# Patient Record
Sex: FEMALE | Race: BLACK OR AFRICAN AMERICAN | Employment: UNEMPLOYED | URBAN - METROPOLITAN AREA
[De-identification: names, ages, dates, MRNs, and addresses within clinical notes are randomized per-mention and may not be internally consistent; named-entity substitution may affect disease eponyms.]

---

## 2023-12-14 LAB
PAP SMEAR: NORMAL
PAP SMEAR: NORMAL

## 2024-02-18 ASSESSMENT — PATIENT HEALTH QUESTIONNAIRE - PHQ9
SUM OF ALL RESPONSES TO PHQ QUESTIONS 1-9: 0
6. FEELING BAD ABOUT YOURSELF - OR THAT YOU ARE A FAILURE OR HAVE LET YOURSELF OR YOUR FAMILY DOWN: NOT AT ALL
5. POOR APPETITE OR OVEREATING: NOT AT ALL
1. LITTLE INTEREST OR PLEASURE IN DOING THINGS: 0
9. THOUGHTS THAT YOU WOULD BE BETTER OFF DEAD, OR OF HURTING YOURSELF: 0
4. FEELING TIRED OR HAVING LITTLE ENERGY: NOT AT ALL
SUM OF ALL RESPONSES TO PHQ QUESTIONS 1-9: 0
9. THOUGHTS THAT YOU WOULD BE BETTER OFF DEAD, OR OF HURTING YOURSELF: NOT AT ALL
4. FEELING TIRED OR HAVING LITTLE ENERGY: 0
10. IF YOU CHECKED OFF ANY PROBLEMS, HOW DIFFICULT HAVE THESE PROBLEMS MADE IT FOR YOU TO DO YOUR WORK, TAKE CARE OF THINGS AT HOME, OR GET ALONG WITH OTHER PEOPLE: NOT DIFFICULT AT ALL
7. TROUBLE CONCENTRATING ON THINGS, SUCH AS READING THE NEWSPAPER OR WATCHING TELEVISION: 0
SUM OF ALL RESPONSES TO PHQ9 QUESTIONS 1 & 2: 0
5. POOR APPETITE OR OVEREATING: 0
3. TROUBLE FALLING OR STAYING ASLEEP: 0
SUM OF ALL RESPONSES TO PHQ QUESTIONS 1-9: 0
6. FEELING BAD ABOUT YOURSELF - OR THAT YOU ARE A FAILURE OR HAVE LET YOURSELF OR YOUR FAMILY DOWN: 0
2. FEELING DOWN, DEPRESSED OR HOPELESS: 0
7. TROUBLE CONCENTRATING ON THINGS, SUCH AS READING THE NEWSPAPER OR WATCHING TELEVISION: NOT AT ALL
8. MOVING OR SPEAKING SO SLOWLY THAT OTHER PEOPLE COULD HAVE NOTICED. OR THE OPPOSITE, BEING SO FIGETY OR RESTLESS THAT YOU HAVE BEEN MOVING AROUND A LOT MORE THAN USUAL: 0
SUM OF ALL RESPONSES TO PHQ QUESTIONS 1-9: 0
2. FEELING DOWN, DEPRESSED OR HOPELESS: NOT AT ALL
SUM OF ALL RESPONSES TO PHQ QUESTIONS 1-9: 0
1. LITTLE INTEREST OR PLEASURE IN DOING THINGS: NOT AT ALL
3. TROUBLE FALLING OR STAYING ASLEEP: NOT AT ALL
10. IF YOU CHECKED OFF ANY PROBLEMS, HOW DIFFICULT HAVE THESE PROBLEMS MADE IT FOR YOU TO DO YOUR WORK, TAKE CARE OF THINGS AT HOME, OR GET ALONG WITH OTHER PEOPLE: 0
8. MOVING OR SPEAKING SO SLOWLY THAT OTHER PEOPLE COULD HAVE NOTICED. OR THE OPPOSITE - BEING SO FIDGETY OR RESTLESS THAT YOU HAVE BEEN MOVING AROUND A LOT MORE THAN USUAL: NOT AT ALL

## 2024-02-21 ENCOUNTER — OFFICE VISIT (OUTPATIENT)
Dept: INTERNAL MEDICINE CLINIC | Age: 39
End: 2024-02-21
Payer: COMMERCIAL

## 2024-02-21 VITALS
SYSTOLIC BLOOD PRESSURE: 110 MMHG | DIASTOLIC BLOOD PRESSURE: 70 MMHG | HEIGHT: 62 IN | TEMPERATURE: 97.4 F | HEART RATE: 83 BPM | BODY MASS INDEX: 19.51 KG/M2 | WEIGHT: 106 LBS | OXYGEN SATURATION: 98 %

## 2024-02-21 DIAGNOSIS — Z80.3 FAMILY HISTORY OF BREAST CANCER: ICD-10-CM

## 2024-02-21 DIAGNOSIS — D70.9 NEUTROPENIA, UNSPECIFIED TYPE (HCC): ICD-10-CM

## 2024-02-21 DIAGNOSIS — E55.9 VITAMIN D DEFICIENCY: ICD-10-CM

## 2024-02-21 DIAGNOSIS — Z00.00 ANNUAL PHYSICAL EXAM: Primary | ICD-10-CM

## 2024-02-21 DIAGNOSIS — Z23 NEED FOR DIPHTHERIA-TETANUS-PERTUSSIS (TDAP) VACCINE: ICD-10-CM

## 2024-02-21 LAB
BILIRUBIN, POC: NORMAL
BLOOD URINE, POC: NORMAL
CLARITY, POC: CLEAR
COLOR, POC: NORMAL
GLUCOSE URINE, POC: NORMAL
KETONES, POC: NORMAL
LEUKOCYTE EST, POC: NORMAL
NITRITE, POC: NORMAL
PH, POC: 7
PROTEIN, POC: NORMAL
SPECIFIC GRAVITY, POC: 1.01
UROBILINOGEN, POC: NORMAL

## 2024-02-21 PROCEDURE — 90471 IMMUNIZATION ADMIN: CPT | Performed by: INTERNAL MEDICINE

## 2024-02-21 PROCEDURE — 81002 URINALYSIS NONAUTO W/O SCOPE: CPT | Performed by: INTERNAL MEDICINE

## 2024-02-21 PROCEDURE — 90715 TDAP VACCINE 7 YRS/> IM: CPT | Performed by: INTERNAL MEDICINE

## 2024-02-21 PROCEDURE — 99396 PREV VISIT EST AGE 40-64: CPT | Performed by: INTERNAL MEDICINE

## 2024-02-21 RX ORDER — ACETAMINOPHEN 160 MG
2000 TABLET,DISINTEGRATING ORAL DAILY
Qty: 90 CAPSULE | Refills: 0 | Status: SHIPPED | OUTPATIENT
Start: 2024-02-21

## 2024-02-21 NOTE — PROGRESS NOTES
SUBJECTIVE:  Ene Rand is a 39 y.o. female HERE FOR   Chief Complaint   Patient presents with    Annual Exam     PHYSICAL         PT HERE FOR EVAL / PHYSICAL EXAM    LAST PHYSICAL -  > 1 YR.  VIT D DEF - PREVIOUS LAB D/W PT  NEUTROPENIA - PREVIOUS LAB D/W PT  + FH BREAST CA - DID NOT FOLLOW FOR MAMMOGRAM. D/W PT  NEEDS TDAP - D/W PT  DEPRESSION-  ? SITUATIONAL.  RESOLVED        DENIES CP, No SOB, No PALPITATIONS, No COUGH, NO F/C  No ABD PAIN, No N/V, No DIARRHEA, No CONSTIPATION, No MELENA, No HEMATOCHEZIA.  No DYSURIA, No FREQ, No URGENCY, No HEMATURIA      PMH: REVIEWED AND UPDATED TODAY    PSH: REVIEWED AND UPDATED TODAY    SOCIAL HX: REVIEWED AND UPDATED TODAY    FAMILY HX: REVIEWED AND UPDATED TODAY    ALLERGY:  Percocet [oxycodone-acetaminophen]    MEDS: REVIEWED  Prior to Visit Medications    Not on File       ROS: COMPREHENSIVE ROS AS IN HX, REST -VE  History obtained from chart review and the patient       OBJECTIVE:   NURSING NOTE AND VITALS REVIEWED  /62   Pulse 83   Temp 97.4 °F (36.3 °C)   Ht 1.575 m (5' 2\")   Wt 48.1 kg (106 lb)   SpO2 98%   BMI 19.39 kg/m²     NO ACUTE DISTRESS    REPEAT BP: 110/70 (LT), NO ORTHOSTASIS     Body mass index is 19.39 kg/m².     WAIST CIRCUMFERENCE: 27    HEENT: NO PALLOR, ANICTERIC, PERRLA, EOMI, NO CONJUNCTIVAL ERYTHEMA,                 NO SINUS TENDERNESS  NECK:  SUPPLE, TRACHEA MIDLINE, NT, NO JVD, NO CB, NO LA, NO TM, NO STIFFNESS  CHEST: RESPY EFFORT NL, GOOD AE, NO W/R/C  HEART: S1S2+ REG, NO M/G/R  ABD: SOFT, NT, NO HSM, BS+  EXT: NO EDEMA, NT, PULSES +. KIMBERLY'S -VE  NEURO: ALERT AND ORIENTED X 3, NO MENINGEAL SIGNS, NO TREMORS, NL GAIT, NO FOCAL DEFICITS  PSYCH: FAIRLY GOOD AFFECT  BACK: NT, NO ROM, NO CVA TENDERNESS     PREVIOUS LABS REVIEWED AND D/W PT    POC UA: NEGATIVE GLUCOSE, NEGATIVE BLOOD, NO LEUKOCYTES    ASSESSMENT / PLAN:     Diagnosis Orders   1. Annual physical exam  COUNSELLED. HEALTH / SAFETY EDUCATION REVIEWED AND

## 2024-02-21 NOTE — PATIENT INSTRUCTIONS
TAKE MED AS ADVISED    DIET/ EXERCISE.    FOLLOW UP WITHIN 1 YEAR /  AS NEEDED    FOLLOW UP FOR FASTING LABS      [facility] Locations - No appointment necessary.  ? indicates the location is open Saturdays in addition to Monday through Friday.   Call your preferred location for test preparation, business hours and other information you need.   [facility] accepts all insurances.  CENTRAL  EAST  [place]    ? [place]   [address]   [address]   [place] [address]    Ph: [phone]  [facility]   [address] [place] [address]    Ph: [phone]   ? [place]   [address]    [place] [address]    Ph: [phone]     [facility]   [address] [place] [address]    Ph: [phone] ? [place]   [address] [place] [address]   Ph: [phone]  ? [facility]   [address] [place] [address]    Ph: [phone]      [place]   [address] [place] [address]   Ph: [phone]    NORTH    ? [facility]   [address] [place] [address]    Ph: [phone]  [facility]   [address] [place] [address]   Ph: [phone]  [place]   [address] [place], [place] [address]vd.   [facility], [address]    PH: [phone]    [facility]   [address] [place] [address]    Ph: [phone]  [place]  [address] [place] [address]  Ph: [phone]  [facility]   [address] [place] [address]    Ph: [phone]

## 2024-03-08 DIAGNOSIS — D70.9 NEUTROPENIA, UNSPECIFIED TYPE (HCC): ICD-10-CM

## 2024-03-08 DIAGNOSIS — E55.9 VITAMIN D DEFICIENCY: ICD-10-CM

## 2024-03-08 DIAGNOSIS — Z00.00 ANNUAL PHYSICAL EXAM: ICD-10-CM

## 2024-03-08 LAB
25(OH)D3 SERPL-MCNC: 16.6 NG/ML
ALBUMIN SERPL-MCNC: 4.7 G/DL (ref 3.4–5)
ALBUMIN/GLOB SERPL: 1.7 {RATIO} (ref 1.1–2.2)
ALP SERPL-CCNC: 60 U/L (ref 40–129)
ALT SERPL-CCNC: 7 U/L (ref 10–40)
ANION GAP SERPL CALCULATED.3IONS-SCNC: 12 MMOL/L (ref 3–16)
AST SERPL-CCNC: 17 U/L (ref 15–37)
BASOPHILS # BLD: 0 K/UL (ref 0–0.2)
BASOPHILS NFR BLD: 0.7 %
BILIRUB SERPL-MCNC: 0.4 MG/DL (ref 0–1)
BUN SERPL-MCNC: 9 MG/DL (ref 7–20)
CALCIUM SERPL-MCNC: 9.6 MG/DL (ref 8.3–10.6)
CHLORIDE SERPL-SCNC: 103 MMOL/L (ref 99–110)
CHOLEST SERPL-MCNC: 194 MG/DL (ref 0–199)
CO2 SERPL-SCNC: 22 MMOL/L (ref 21–32)
CREAT SERPL-MCNC: 0.6 MG/DL (ref 0.6–1.1)
DEPRECATED RDW RBC AUTO: 13.8 % (ref 12.4–15.4)
EOSINOPHIL # BLD: 0.1 K/UL (ref 0–0.6)
EOSINOPHIL NFR BLD: 2.6 %
GFR SERPLBLD CREATININE-BSD FMLA CKD-EPI: >60 ML/MIN/{1.73_M2}
GLUCOSE SERPL-MCNC: 80 MG/DL (ref 70–99)
HCT VFR BLD AUTO: 36.3 % (ref 36–48)
HCV AB SERPL QL IA: NORMAL
HDLC SERPL-MCNC: 46 MG/DL (ref 40–60)
HGB BLD-MCNC: 12.7 G/DL (ref 12–16)
LDLC SERPL CALC-MCNC: 136 MG/DL
LYMPHOCYTES # BLD: 1.7 K/UL (ref 1–5.1)
LYMPHOCYTES NFR BLD: 47.1 %
MCH RBC QN AUTO: 30.5 PG (ref 26–34)
MCHC RBC AUTO-ENTMCNC: 35 G/DL (ref 31–36)
MCV RBC AUTO: 87.2 FL (ref 80–100)
MONOCYTES # BLD: 0.5 K/UL (ref 0–1.3)
MONOCYTES NFR BLD: 14 %
NEUTROPHILS # BLD: 1.3 K/UL (ref 1.7–7.7)
NEUTROPHILS NFR BLD: 35.6 %
PLATELET # BLD AUTO: 327 K/UL (ref 135–450)
PMV BLD AUTO: 8.2 FL (ref 5–10.5)
POTASSIUM SERPL-SCNC: 4.1 MMOL/L (ref 3.5–5.1)
PROT SERPL-MCNC: 7.4 G/DL (ref 6.4–8.2)
RBC # BLD AUTO: 4.17 M/UL (ref 4–5.2)
SODIUM SERPL-SCNC: 137 MMOL/L (ref 136–145)
TRIGL SERPL-MCNC: 58 MG/DL (ref 0–150)
TSH SERPL DL<=0.005 MIU/L-ACNC: 1.56 UIU/ML (ref 0.27–4.2)
VLDLC SERPL CALC-MCNC: 12 MG/DL
WBC # BLD AUTO: 3.6 K/UL (ref 4–11)

## 2024-03-09 LAB
EST. AVERAGE GLUCOSE BLD GHB EST-MCNC: 88.2 MG/DL
HBA1C MFR BLD: 4.7 %

## 2024-03-19 ENCOUNTER — HOSPITAL ENCOUNTER (OUTPATIENT)
Dept: MAMMOGRAPHY | Age: 39
Discharge: HOME | End: 2024-03-19
Payer: COMMERCIAL

## 2024-03-19 VITALS — HEIGHT: 62 IN | WEIGHT: 106 LBS | BODY MASS INDEX: 19.51 KG/M2

## 2024-03-19 DIAGNOSIS — Z80.3 FAMILY HISTORY OF BREAST CANCER: ICD-10-CM

## 2024-03-19 PROCEDURE — 77063 BREAST TOMOSYNTHESIS BI: CPT

## 2025-05-20 SDOH — ECONOMIC STABILITY: FOOD INSECURITY: WITHIN THE PAST 12 MONTHS, YOU WORRIED THAT YOUR FOOD WOULD RUN OUT BEFORE YOU GOT MONEY TO BUY MORE.: NEVER TRUE

## 2025-05-20 SDOH — ECONOMIC STABILITY: FOOD INSECURITY: WITHIN THE PAST 12 MONTHS, THE FOOD YOU BOUGHT JUST DIDN'T LAST AND YOU DIDN'T HAVE MONEY TO GET MORE.: NEVER TRUE

## 2025-05-20 SDOH — ECONOMIC STABILITY: INCOME INSECURITY: IN THE LAST 12 MONTHS, WAS THERE A TIME WHEN YOU WERE NOT ABLE TO PAY THE MORTGAGE OR RENT ON TIME?: NO

## 2025-05-20 SDOH — ECONOMIC STABILITY: TRANSPORTATION INSECURITY
IN THE PAST 12 MONTHS, HAS THE LACK OF TRANSPORTATION KEPT YOU FROM MEDICAL APPOINTMENTS OR FROM GETTING MEDICATIONS?: NO

## 2025-05-20 SDOH — ECONOMIC STABILITY: TRANSPORTATION INSECURITY
IN THE PAST 12 MONTHS, HAS LACK OF TRANSPORTATION KEPT YOU FROM MEETINGS, WORK, OR FROM GETTING THINGS NEEDED FOR DAILY LIVING?: NO

## 2025-05-20 ASSESSMENT — PATIENT HEALTH QUESTIONNAIRE - PHQ9
SUM OF ALL RESPONSES TO PHQ QUESTIONS 1-9: 0
1. LITTLE INTEREST OR PLEASURE IN DOING THINGS: NOT AT ALL
SUM OF ALL RESPONSES TO PHQ9 QUESTIONS 1 & 2: 0
SUM OF ALL RESPONSES TO PHQ QUESTIONS 1-9: 0
SUM OF ALL RESPONSES TO PHQ QUESTIONS 1-9: 0
1. LITTLE INTEREST OR PLEASURE IN DOING THINGS: NOT AT ALL
2. FEELING DOWN, DEPRESSED OR HOPELESS: NOT AT ALL
SUM OF ALL RESPONSES TO PHQ QUESTIONS 1-9: 0
2. FEELING DOWN, DEPRESSED OR HOPELESS: NOT AT ALL

## 2025-05-21 ENCOUNTER — OFFICE VISIT (OUTPATIENT)
Dept: INTERNAL MEDICINE CLINIC | Age: 40
End: 2025-05-21

## 2025-05-21 VITALS
BODY MASS INDEX: 20.92 KG/M2 | OXYGEN SATURATION: 99 % | WEIGHT: 110.8 LBS | HEIGHT: 61 IN | SYSTOLIC BLOOD PRESSURE: 110 MMHG | TEMPERATURE: 98.3 F | DIASTOLIC BLOOD PRESSURE: 70 MMHG | HEART RATE: 89 BPM

## 2025-05-21 DIAGNOSIS — M79.675 PAIN OF LEFT GREAT TOE: Primary | ICD-10-CM

## 2025-05-21 DIAGNOSIS — E55.9 VITAMIN D DEFICIENCY: ICD-10-CM

## 2025-05-21 DIAGNOSIS — M79.675 PAIN OF LEFT GREAT TOE: ICD-10-CM

## 2025-05-21 DIAGNOSIS — D70.9 NEUTROPENIA, UNSPECIFIED TYPE: ICD-10-CM

## 2025-05-21 LAB
CONTROL: NORMAL
PREGNANCY TEST URINE, POC: NORMAL

## 2025-05-21 RX ORDER — IBUPROFEN 800 MG/1
800 TABLET, FILM COATED ORAL EVERY 8 HOURS PRN
Qty: 60 TABLET | Refills: 0 | Status: SHIPPED | OUTPATIENT
Start: 2025-05-21

## 2025-05-21 NOTE — PATIENT INSTRUCTIONS
TAKE MED AS ADVISED    DIET/ EXERCISE.    FOLLOW UP WITHIN 3 MONTHS / AS NEEDED    FOLLOW UP FOR LABS, X RAY    [facility] Locations - No appointment necessary.  ? indicates the location is open Saturdays in addition to Monday through Friday.   Call your preferred location for test preparation, business hours and other information you need.   [facility] accepts all insurances.  CENTRAL  EAST  [place]    ? [place]   [address]   [address]   [place] [address]    Ph: [phone]  [facility]   [address] [place] [address]    Ph: [phone]   ? [place]   [address]    [place] [address]    Ph: [phone]     [facility]   [address] [place] [address]    Ph: [phone] ? [place]   [address] [address]   Ph: [phone]  ? [facility]   [address] [place] [address]    Ph: [phone]      [place]   [address] [place] [address]   Ph: [phone]     [facility]  [address] [place] [address]   Ph: [phone]    [place]    ? [facility]   [address] [place] [address]    Ph: [phone]  [facility]   [address] [place] [address]   Ph: [phone]  [place]   [address]   [facility], [address]    PH: [phone]    [facility]   [address] [place] [address]    Ph: [phone]  [place]  [address] [place] [address]  Ph: [phone]  [facility]   [address] [place] [address]    Ph: [phone]

## 2025-05-21 NOTE — PROGRESS NOTES
SUBJECTIVE:  Ene Rand is a 40 y.o. female HERE FOR   Chief Complaint   Patient presents with    Foot Pain     Acute: Left foot pain, initiated Sunday, 5/18/2025        PT HERE FOR EVAL     C/O LT GREAT TOE PAIN - PAST 3 DAYS. DULL ACHE, OCC STIFFNESS, NO RAD, PAIN SCALE 8/10 @ MAX. NOW 2/10. DIFFICULTY WITH WT BEARING IMPROVED. NO SWELLING, NO KNOWN TRAUMA    VIT D DEF - STATES TAKING MED. PREVIOUS LAB D/W PT  NEUTROPENIA - PREVIOUS LAB D/W PT        DENIES CP, No SOB, No PALPITATIONS, No COUGH, NO F/C  No ABD PAIN, No N/V, No DIARRHEA, No CONSTIPATION, No MELENA, No HEMATOCHEZIA.  No DYSURIA, No FREQ, No URGENCY, No HEMATURIA    PMH: REVIEWED AND UPDATED TODAY    PSH: REVIEWED AND UPDATED TODAY    SOCIAL HX: REVIEWED AND UPDATED TODAY    FAMILY HX: REVIEWED AND UPDATED TODAY    ALLERGY:  Percocet [oxycodone-acetaminophen]    MEDS: REVIEWED  Prior to Visit Medications    Medication Sig Taking? Authorizing Provider   vitamin D (VITAMIN D3) 50 MCG (2000 UT) CAPS capsule Take 1 capsule by mouth daily Yes Magda Green MD      ROS: COMPREHENSIVE ROS AS IN HX, REST -VE  History obtained from chart review and the patient       OBJECTIVE:   NURSING NOTE AND VITALS REVIEWED  /72   Pulse 89   Temp 98.3 °F (36.8 °C) (Oral)   Ht 1.552 m (5' 1.12\")   Wt 50.3 kg (110 lb 12.8 oz)   LMP 05/02/2025   SpO2 99%   Breastfeeding No   BMI 20.85 kg/m²     NO ACUTE DISTRESS    REPEAT BP: 110/70 (RT), NO ORTHOSTASIS     Body mass index is 20.85 kg/m².     HEENT: NO PALLOR, ANICTERIC, PERRLA, EOMI, NO CONJUNCTIVAL ERYTHEMA,                 NO SINUS TENDERNESS  NECK:  SUPPLE, TRACHEA MIDLINE, NT, NO JVD, NO CB, NO LA, NO TM, NO STIFFNESS  CHEST: RESPY EFFORT NL, GOOD AE, NO W/R/C  HEART: S1S2+ REG, NO M/G/R  ABD: SOFT, NT, NO HSM, BS+  EXT: NO EDEMA, NT, PULSES +. KIMBERLY'S -VE  NEURO: ALERT AND ORIENTED X 3, NO MENINGEAL SIGNS, NO TREMORS, NL GAIT, NO FOCAL DEFICITS  PSYCH: FAIRLY GOOD AFFECT  BACK: NT, NO ROM, NO

## 2025-05-22 LAB
25(OH)D3 SERPL-MCNC: 32.7 NG/ML
URATE SERPL-MCNC: 4.9 MG/DL (ref 2.6–6)

## 2025-06-20 ENCOUNTER — HOSPITAL ENCOUNTER (OUTPATIENT)
Dept: GENERAL RADIOLOGY | Age: 40
Discharge: HOME | End: 2025-06-20
Payer: COMMERCIAL

## 2025-06-20 DIAGNOSIS — M79.675 PAIN OF LEFT GREAT TOE: ICD-10-CM

## 2025-06-20 PROCEDURE — 73660 X-RAY EXAM OF TOE(S): CPT

## 2025-08-13 ENCOUNTER — OFFICE VISIT (OUTPATIENT)
Dept: INTERNAL MEDICINE CLINIC | Age: 40
End: 2025-08-13
Payer: COMMERCIAL

## 2025-08-13 VITALS
TEMPERATURE: 97.9 F | WEIGHT: 110 LBS | OXYGEN SATURATION: 99 % | BODY MASS INDEX: 20.77 KG/M2 | HEIGHT: 61 IN | DIASTOLIC BLOOD PRESSURE: 78 MMHG | SYSTOLIC BLOOD PRESSURE: 110 MMHG | HEART RATE: 74 BPM

## 2025-08-13 DIAGNOSIS — M79.675 PAIN OF LEFT GREAT TOE: ICD-10-CM

## 2025-08-13 DIAGNOSIS — Z12.4 SCREENING FOR CERVICAL CANCER: ICD-10-CM

## 2025-08-13 DIAGNOSIS — Z00.00 ANNUAL PHYSICAL EXAM: Primary | ICD-10-CM

## 2025-08-13 DIAGNOSIS — R31.29 MICROSCOPIC HEMATURIA: ICD-10-CM

## 2025-08-13 DIAGNOSIS — E55.9 VITAMIN D DEFICIENCY: ICD-10-CM

## 2025-08-13 DIAGNOSIS — H61.23 IMPACTED CERUMEN, BILATERAL: ICD-10-CM

## 2025-08-13 LAB
BILIRUBIN, POC: NORMAL
BLOOD URINE, POC: NORMAL
CLARITY, POC: CLEAR
COLOR, POC: YELLOW
CONTROL: NORMAL
GLUCOSE URINE, POC: NORMAL MG/DL
KETONES, POC: 15 MG/DL
LEUKOCYTE EST, POC: NORMAL
NITRITE, POC: NORMAL
PH, POC: 5.5
PREGNANCY TEST URINE, POC: NORMAL
PROTEIN, POC: NORMAL MG/DL
SPECIFIC GRAVITY, POC: 1.02
UROBILINOGEN, POC: 0.2 MG/DL

## 2025-08-13 PROCEDURE — 99396 PREV VISIT EST AGE 40-64: CPT | Performed by: INTERNAL MEDICINE

## 2025-08-13 PROCEDURE — 81025 URINE PREGNANCY TEST: CPT | Performed by: INTERNAL MEDICINE

## 2025-08-13 PROCEDURE — 81002 URINALYSIS NONAUTO W/O SCOPE: CPT | Performed by: INTERNAL MEDICINE
